# Patient Record
Sex: FEMALE
[De-identification: names, ages, dates, MRNs, and addresses within clinical notes are randomized per-mention and may not be internally consistent; named-entity substitution may affect disease eponyms.]

---

## 2019-03-18 ENCOUNTER — HOSPITAL ENCOUNTER (EMERGENCY)
Dept: HOSPITAL 25 - ED | Age: 24
Discharge: HOME | End: 2019-03-18
Payer: SELF-PAY

## 2019-03-18 VITALS — SYSTOLIC BLOOD PRESSURE: 121 MMHG | DIASTOLIC BLOOD PRESSURE: 87 MMHG

## 2019-03-18 DIAGNOSIS — M25.512: Primary | ICD-10-CM
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
## 2019-03-18 DIAGNOSIS — M25.562: ICD-10-CM
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
## 2019-03-18 DIAGNOSIS — R68.84: ICD-10-CM

## 2019-03-18 PROCEDURE — 99282 EMERGENCY DEPT VISIT SF MDM: CPT

## 2019-03-18 PROCEDURE — 96372 THER/PROPH/DIAG INJ SC/IM: CPT

## 2019-03-18 PROCEDURE — 70486 CT MAXILLOFACIAL W/O DYE: CPT

## 2019-03-18 NOTE — ED
Adult Trauma





- HPI Summary


HPI Summary: 


23-year-old female presents with left shoulder knee pain today.  She states she 

and landing on her left side.  She states that she lost her balance on uneven 

sidewalk.  She denies any head injury.  She did strike the left side of her 

jaw.  No loss consciousness.  No nausea vomiting.  She states she felt very 

tingly but it was likely due to anxiety. she denies any headache. She denies 

any neck pain.  No back pain.  Denies any ankle or hip pain.  Denies any other 

injury.  Has history of chronic shoulder issues on left side.  Is left-handed.  

she is a student. 








- History of Current Complaint


Chief Complaint: EDExtremityLower


Stated Complaint: FELL PER EMS


Time Seen by Provider: 03/18/19 15:46


Pain Intensity: 6





- Allergy/Home Medications


Allergies/Adverse Reactions: 


 Allergies











Allergy/AdvReac Type Severity Reaction Status Date / Time


 


No Known Allergies Allergy   Verified 03/18/19 17:27














PMH/Surg Hx/FS Hx/Imm Hx


Endocrine/Hematology History: 


   Denies: Hx Anticoagulant Therapy


Cardiovascular History: 


   Denies: Hx Myocardial Infarction


Psychiatric History: Reports: Hx Anxiety


Infectious Disease History: No


Infectious Disease History: 


   Denies: Traveled Outside the [place] in Last 30 Days





- Family History


Known Family History: Positive: Non-Contributory





- Social History


Occupation: Student


Substance Use Type: Reports: None





Review of Systems


Negative: Fever


Negative: Chest Pain


Negative: Shortness Of Breath


Positive: Myalgia - left shoulder and knee pain


All Other Systems Reviewed And Are Negative: Yes





Physical Exam


Triage Information Reviewed: Yes


Vital Signs On Initial Exam: 


 Initial Vitals











Temp Pulse Resp BP Pulse Ox


 


 98.1 F   106   16   121/97   100 


 


 03/18/19 14:01  03/18/19 14:01  03/18/19 14:01  03/18/19 14:01  03/18/19 14:01











Vital Signs Reviewed: Yes


Appearance: Positive: Well-Appearing


Skin: Positive: Warm, Dry


Head/Face: Positive: Normal Head/Face Inspection


Eyes: Positive: Normal, EOMI, ELVIN, Conjunctiva Clear


ENT: Positive: Normal ENT inspection, Pharynx normal, TMs normal


Neck: Positive: Other: - nontender neck


Respiratory/Lung Sounds: Positive: Clear to Auscultation, Breath Sounds Present


Cardiovascular: Positive: Normal, RRR


Musculoskeletal: Positive: Limited @ - left shoulder and knee, Other - 

tenderness over left shoulder, tenderness over patella left, good pulses, 

sensation grossly intact. good  strength


Neurological: Positive: Normal


Psychiatric: Positive: Normal





Diagnostics





- Vital Signs


 Vital Signs











  Temp Pulse Resp BP Pulse Ox


 


 03/18/19 14:01  98.1 F  106  16  121/97  100














- Laboratory


Lab Statement: Any lab studies that have been ordered have been reviewed, and 

results considered in the medical decision making process.





- Radiology


  ** knee


Radiology Interpretation Completed By: Radiologist


Summary of Radiographic Findings: no fracture





  ** shoulder


Radiology Interpretation Completed By: Radiologist


Summary of Radiographic Findings: REPORT AND IMPRESSION:  #. Suggestion of a 

tiny osseous avulsion at the greater tuberosity of the humerus.  corresponding 

with the supraspinatus tendon insertion reference the external rotation.  view. 

The differential would include a small focus of calcific tendinopathy.  #. 

Normal acromioclavicular and glenohumeral joint alignment.  #. Unremarkable 

soft tissue contours.





- CT


  ** maxillary


CT Interpretation Completed By: Radiologist


Summary of CT Findings: IMPRESSION:  #. Negative for maxillofacial fracture.





Re-Evaluation





- Re-Evaluation


  ** First Eval


Re-Evaluation Time: 17:01


Comment: discussed results, and patient is upset that can not get an MRI from 

the ER, explained this is an outpatient test and can be ordered by [facility] or 

[facility]





Adult Trauma Course/Dx





- Course


Course Of Treatment: 23-year-old female presents with left shoulder knee pain 

today.  She states she and landing on her left side.  She states that she lost 

her balance on uneven sidewalk.  She denies any head injury.  She did strike 

the left side of her jaw.  No loss consciousness.  No nausea vomiting.  She 

states she felt very tingly but it was likely due to anxiety. she denies any 

headache. She denies any neck pain.  No back pain.  Denies any ankle or hip 

pain.  Denies any other injury.  Has history of chronic shoulder issues on left 

side.  Is left-handed.  she is a student.  On exam tenderness over left 

shoulder.  Neurovascularly intact.  Tenderness over left patella.  Tenderness 

of her lower jaw.  States no loose teeth and denies any change in bite but is 

concerned that the hairline fractures so we'll have to get a CT.  CT is normal. 

xray knee normal. xray knee normal. xray shoulder shows avulsion on the tendon 

possible. gave sling. discussed mri will have to be an outpatient test and 

should follow up with ortho, gave flexeril for pain. patient understand and 

agrees with plan.





- Diagnoses


Differential Diagnosis/HQI/PQRI: Positive: Contusion(s), Fracture, Strain


Provider Diagnoses: 


 Left shoulder pain, Fall, Left knee pain, Jaw pain








Discharge





- Sign-Out/Discharge


Documenting (check all that apply): Patient Departure


Patient Received Moderate/Deep Sedation with Procedure: No





- Discharge Plan


Condition: Good


Disposition: HOME


Prescriptions: 


Cyclobenzaprine TAB* [Flexeril 10 MG TAB*] 10 mg PO TID PRN #15 tab


 PRN Reason: Pain


Patient Education Materials:  Shoulder Pain (ED)


Forms:  *School Release


Referrals: 


[facility],PT [Medical Doctor] - 


Minh Hernandez MD [Medical Doctor] - 


Additional Instructions: 


Take Tylenol and ibuprofen every 6 hours as needed for pain


can use flexeril up to three times a day, start with medication at night as can 

make drowsy


Ice/heat


use sling for comfort


Can rest for one day and then need to do range of motion activities for shoulder


Follow up with ortho


Return to ED if develop any new or worsening symptoms





- Billing Disposition and Condition


Condition: GOOD


Disposition: Home